# Patient Record
(demographics unavailable — no encounter records)

---

## 2024-10-08 NOTE — PLAN
[FreeTextEntry1] : Acute Sinusitis  nasal saline rinses  start augmentin 875-125mg po bid x 10 days w/ food  Probiotic po daily Increase Fluids Advised humidifier use   right leg trace pitting edema  elevate legs  f/u if no relief pt agreed w/plan

## 2024-10-08 NOTE — HISTORY OF PRESENT ILLNESS
[Congestion] : congestion [Cold Symptoms] : cold symptoms [Sore Throat] : sore throat [FreeTextEntry8] :  75yo M presents for c/o nasal congestion x few weeks and he has clear discharge but it wont resolve he has frontal sinus pain and also around the nose he has a scratchy throat but no pain and he feels mucus in the back of his throat  denies fevers,chills, body aches, cough, sore throat, sob, chest pain, diarrhea, loss of taste or smell he has been using dayquil/nyquil and tylenol sinus and cold he took a covid test and it was neg

## 2024-10-08 NOTE — PHYSICAL EXAM
[Normal Oropharynx] : the oropharynx was normal [No Lymphadenopathy] : no lymphadenopathy [Supple] : supple [Normal] : normal rate, regular rhythm, normal S1 and S2 and no murmur heard [No Focal Deficits] : no focal deficits [Normal Affect] : the affect was normal [Normal Mood] : the mood was normal [Normal Insight/Judgement] : insight and judgment were intact [de-identified] : clear discharge in nose, turbinate inflammation noted , mucus in back of throat  [de-identified] : no calf tenderness b/l LE, right leg trace pitting edema

## 2024-10-08 NOTE — REVIEW OF SYSTEMS
[Fever] : no fever [Chills] : no chills [Nasal Discharge] : nasal discharge [Sore Throat] : no sore throat [Chest Pain] : no chest pain [Shortness Of Breath] : no shortness of breath [Cough] : no cough

## 2024-11-07 NOTE — HISTORY OF PRESENT ILLNESS
[Lower back] : lower back [8] : 8 [5] : 5 [Burning] : burning [Dull/Aching] : dull/aching [Radiating] : radiating [Tightness] : tightness [Constant] : constant [Leisure] : leisure [Social interactions] : social interactions [Meds] : meds [FreeTextEntry1] : 2024 - Patient presents for FUV regarding their lower back/left leg pain. Patient was last seen about 7 months ago and was doing well with significant decrease in pain. Patient reports a gradual return of symptoms over the past month. Pain is located to axial low back with radiation into the left buttocks, posterior thigh and calf. He continues to go to formal PT 1 time/week and remains active daily as a . Presents today to discuss next steps in his treatment plan with the hope of decreasing his pain and increasing his functionality and quality of life.   4/15/2024 - Patient presents for FUV after a L5-S1 LESI on 3/29/2024.  Patient reports an 80% overall relief from pain, his radicular left thigh pain has mostly resolved, but he still has some residual pain focal to the axial lower back; he continues to go to formal PT once a week with short term benefit and performs a HEP.    2024 - The patient presents for initial evaluation regarding their lower back/left leg pain. Patient was referred by Dr. Black. Patient has a long-standing history of lower back pain, his pain is across the lower back with radiation down the left leg, pain distribution is even but with bending and lifting his lower back pain is more prominent; he has paresthesias bilaterally in the feet, worse at night when laying down. Patient takes Advil PRN and 300 mg of gabapentin QID, and has been doing PT.   Has undergone lumbar epidural steroid injections in the past with me over 4 years ago with significant benefit.  Injections: 1) L5-S1 LESI (3/29/2024)  Pertinent Surgical History: N/A   Imagin) MRI Cervical Spine (12/3/2023) - [facility]  C2-3: There is left facet arthropathy without central canal stenosis or foraminal narrowing C3-4: There is broad-based disc herniation, ligamentum flavum thickening resulting in mild central canal stenosis. Uncovertebral arthropathy results in severe left foraminal narrowing C4-5: There is a grade 1 retrolisthesis, advanced degenerative disc disease with disc osteophyte complex resulting in moderate central canal stenosis. Uncovertebral arthropathy results in severe right and mild left foraminal narrowing C5-6: There is small disc osteophyte complex without significant central canal stenosis. C6-7: There is a disc osteophyte complex without significant central canal stenosis. Uncovertebral arthropathy results in mild right foraminal narrowing C7-T1: There is a right paracentral disc herniation without central canal stenosis   2) MRI Lumbar Spine (10/3/2023) - [facility]  There is degenerative disc disease with decreased T2 disc signal from L1-L2 through L5-S1 L1-2: There is diffuse disc bulging without significant central canal stenosis. There is mild left foraminal narrowing L2-3: There is diffuse disc bulging and facet arthropathy resulting in moderate bilateral foraminal narrowing L3-4: There is diffuse disc bulging, right paracentral disc herniation facet arthropathy impinging the descending right L4 nerve roots with moderate bilateral foraminal narrowing and mild central canal stenosis L4-5: There is diffuse disc bulging, ligamentum flavum thickening and facet arthropathy resulting in moderate central canal stenosis, impingement of the descending L5 nerve roots, and moderate foraminal narrowing with impingement of the extraforaminal right L4 nerve L5-S1: There is diffuse disc bulging and facet arthropathy resulting in moderate right and severe left foraminal narrowing S2 and exiting left L5 nerve roots  Physician Disclaimer: I have personally reviewed and confirmed all HPI data with the patient.  [] : Post Surgical Visit: no [FreeTextEntry7] : LEFT HIP, LEFT FOOT  [de-identified] : Driving after football practice  [de-identified] : C MRI AT

## 2024-11-07 NOTE — HISTORY OF PRESENT ILLNESS
[Lower back] : lower back [8] : 8 [5] : 5 [Burning] : burning [Dull/Aching] : dull/aching [Radiating] : radiating [Tightness] : tightness [Constant] : constant [Leisure] : leisure [Social interactions] : social interactions [Meds] : meds [FreeTextEntry1] : 2024 - Patient presents for FUV regarding their lower back/left leg pain. Patient was last seen about 7 months ago and was doing well with significant decrease in pain. Patient reports a gradual return of symptoms over the past month. Pain is located to axial low back with radiation into the left buttocks, posterior thigh and calf. He continues to go to formal PT 1 time/week and remains active daily as a . Presents today to discuss next steps in his treatment plan with the hope of decreasing his pain and increasing his functionality and quality of life.   4/15/2024 - Patient presents for FUV after a L5-S1 LESI on 3/29/2024.  Patient reports an 80% overall relief from pain, his radicular left thigh pain has mostly resolved, but he still has some residual pain focal to the axial lower back; he continues to go to formal PT once a week with short term benefit and performs a HEP.    2024 - The patient presents for initial evaluation regarding their lower back/left leg pain. Patient was referred by Dr. Black. Patient has a long-standing history of lower back pain, his pain is across the lower back with radiation down the left leg, pain distribution is even but with bending and lifting his lower back pain is more prominent; he has paresthesias bilaterally in the feet, worse at night when laying down. Patient takes Advil PRN and 300 mg of gabapentin QID, and has been doing PT.   Has undergone lumbar epidural steroid injections in the past with me over 4 years ago with significant benefit.  Injections: 1) L5-S1 LESI (3/29/2024)  Pertinent Surgical History: N/A   Imagin) MRI Cervical Spine (12/3/2023) - [facility]  C2-3: There is left facet arthropathy without central canal stenosis or foraminal narrowing C3-4: There is broad-based disc herniation, ligamentum flavum thickening resulting in mild central canal stenosis. Uncovertebral arthropathy results in severe left foraminal narrowing C4-5: There is a grade 1 retrolisthesis, advanced degenerative disc disease with disc osteophyte complex resulting in moderate central canal stenosis. Uncovertebral arthropathy results in severe right and mild left foraminal narrowing C5-6: There is small disc osteophyte complex without significant central canal stenosis. C6-7: There is a disc osteophyte complex without significant central canal stenosis. Uncovertebral arthropathy results in mild right foraminal narrowing C7-T1: There is a right paracentral disc herniation without central canal stenosis   2) MRI Lumbar Spine (10/3/2023) - [facility]  There is degenerative disc disease with decreased T2 disc signal from L1-L2 through L5-S1 L1-2: There is diffuse disc bulging without significant central canal stenosis. There is mild left foraminal narrowing L2-3: There is diffuse disc bulging and facet arthropathy resulting in moderate bilateral foraminal narrowing L3-4: There is diffuse disc bulging, right paracentral disc herniation facet arthropathy impinging the descending right L4 nerve roots with moderate bilateral foraminal narrowing and mild central canal stenosis L4-5: There is diffuse disc bulging, ligamentum flavum thickening and facet arthropathy resulting in moderate central canal stenosis, impingement of the descending L5 nerve roots, and moderate foraminal narrowing with impingement of the extraforaminal right L4 nerve L5-S1: There is diffuse disc bulging and facet arthropathy resulting in moderate right and severe left foraminal narrowing S2 and exiting left L5 nerve roots  Physician Disclaimer: I have personally reviewed and confirmed all HPI data with the patient.  [] : Post Surgical Visit: no [FreeTextEntry7] : LEFT HIP, LEFT FOOT  [de-identified] : Driving after football practice  [de-identified] : C MRI AT

## 2024-11-07 NOTE — PHYSICAL EXAM
[de-identified] : Constitutional:   - No acute distress   - Well developed; well nourished    Neurological:   - normal mood and affect   - alert and oriented x 3     Cardiovascular:   - grossly normal   Lumbar Spine Exam:   Inspection:  erythema (-)  ecchymosis (-)  rashes (-)  alignment: no scoliosis   Palpation:  Midline lumbar tenderness:            (-)  midline thoracic tenderness:          (-)  Lumbar paraspinal tenderness:  L (-) ; R (-)  thoracic paraspinal tenderness: L (-) ; R (-)  sciatic nerve tenderness :          L (-) ; R (-)  SI joint tenderness:                     L (-) ; R (-)  GTB tenderness:                        L (-);  R (-)   ROM: WNL; stiffness throughout ROM testing pain with extreme extension  Strength:                                     Right       Left     Hip Flexion:                (5/5)       (5/5)  Quadriceps:               (5/5)       (5/5)  Hamstrings:                (5/5)       (5/5)  Ankle Dorsiflexion:     (5/5)       (5/5)  EHL:                           (5/5)       (5/5)  Ankle Plantarflexion:  (5/5)       (5/5)   Special Tests:  SLR:                            R (-); L (+)  Facet loading:             R (+); L (+)  ZA test:                R (n/a); L (n/a)  Hamstring tightness:   R (+); L (+)   Neurologic:  SILT throughout right lower extremity  SILT throughout left lower extremity   Reflexes normal and symmetric bilateral lower extremities   Gait:  Mildly antalgic gait  ambulates without assistive device

## 2024-11-07 NOTE — PHYSICAL EXAM
[de-identified] : Constitutional:   - No acute distress   - Well developed; well nourished    Neurological:   - normal mood and affect   - alert and oriented x 3     Cardiovascular:   - grossly normal   Lumbar Spine Exam:   Inspection:  erythema (-)  ecchymosis (-)  rashes (-)  alignment: no scoliosis   Palpation:  Midline lumbar tenderness:            (-)  midline thoracic tenderness:          (-)  Lumbar paraspinal tenderness:  L (-) ; R (-)  thoracic paraspinal tenderness: L (-) ; R (-)  sciatic nerve tenderness :          L (-) ; R (-)  SI joint tenderness:                     L (-) ; R (-)  GTB tenderness:                        L (-);  R (-)   ROM: WNL; stiffness throughout ROM testing pain with extreme extension  Strength:                                     Right       Left     Hip Flexion:                (5/5)       (5/5)  Quadriceps:               (5/5)       (5/5)  Hamstrings:                (5/5)       (5/5)  Ankle Dorsiflexion:     (5/5)       (5/5)  EHL:                           (5/5)       (5/5)  Ankle Plantarflexion:  (5/5)       (5/5)   Special Tests:  SLR:                            R (-); L (+)  Facet loading:             R (+); L (+)  ZA test:                R (n/a); L (n/a)  Hamstring tightness:   R (+); L (+)   Neurologic:  SILT throughout right lower extremity  SILT throughout left lower extremity   Reflexes normal and symmetric bilateral lower extremities   Gait:  Mildly antalgic gait  ambulates without assistive device

## 2024-11-07 NOTE — ASSESSMENT
[FreeTextEntry1] : A discussion regarding available pain management treatment options occurred with the patient.  These included interventional, rehabilitative, pharmacological, and alternative modalities. We will proceed with the following:    Interventional treatment options: - Proceed with repeat left PM L5-S1 LESI (80 mg Kenalog) with fluoroscopic guidance - Can consider with bilateral L4-L5, L5-S1 facet joint MBB with continued axial low back pain - see additional instructions below    Rehabilitative options:   - Continue physical therapy, new script provided today - participation in active HEP was discussed and encouraged as tolerated - Home exercise sheet provided previously  Medication based treatment options: - continue ibuprofen 400-600 mg up to TID as needed - Continue gabapentin 1200 mg daily as per PCP; further titration based upon clinical response  - see additional instructions below    Complementary treatment options:   - Weight management and lifestyle modifications discussed    Additional treatment recommendations as follows:   - Patient will follow-up with Dr. Black as directed - Follow up 1-2 weeks post injection for assessment of efficacy and further treatment recommendations  The risks, benefits and alternatives of the proposed procedure were explained in detail with the patient. The risks outlined include but are not limited to infection, bleeding, post- dural puncture headache, nerve injury, a temporary increase in pain, failure to resolve symptoms, need for future interventions, allergic reaction, and possible elevation of blood sugar in diabetics if using corticosteroid.  All questions were answered to patient's apparent satisfaction, and he/she verbalized an understanding.  We have discussed the risks, benefits, and alternatives for NSAID therapy including but not limited to the risk of bleeding, thrombosis, gastric mucosal irritation/ulceration, allergic reaction and kidney dysfunction.  The patient verbalizes an understanding.  I, Sharita Corea NP, acting as scribe, attest that this documentation has been prepared under the direction and in the presence of Provider Negro Kinney DO.    The documentation recorded by the scribe, in my presence, accurately reflects the service I personally performed, and the decisions made by me with my edits as appropriate.

## 2024-12-10 NOTE — PLAN
[FreeTextEntry1] : HTN controlled labs reviewed  continue lisinopril 5mg po daily continue to monitor bp and f/u sooner if bp is rising  DM II controlled a1c was 6.4  continue metformin ER 500mg 2 tabs po qhs  HLD controlled  recent labs reviewed  continue rosuvastatin 5mg po qhsHLD denies muscle pain   f/u 3mos or sooner if issues arise pt agreed w/plan.

## 2024-12-10 NOTE — PHYSICAL EXAM
[No Lymphadenopathy] : no lymphadenopathy [Supple] : supple [Normal] : normal rate, regular rhythm, normal S1 and S2 and no murmur heard [No Edema] : there was no peripheral edema [No Focal Deficits] : no focal deficits [Normal Affect] : the affect was normal [Normal Mood] : the mood was normal [Normal Insight/Judgement] : insight and judgment were intact [de-identified] : no calf tenderness b/l LE

## 2024-12-10 NOTE — HISTORY OF PRESENT ILLNESS
[FreeTextEntry1] : patient presents for follow up. [de-identified] : 75yo M presents for f/u HTN, HLD and DM II  he is feeling well  Denies chest pain, palpitations, shortness of breath, Headaches, vision changes or LE edema denies muscle pain he saw the endocrinologist last week

## 2024-12-26 NOTE — IMAGING
[de-identified] : The patient is a well appearing 76 year old male of their stated age. Patient ambulates with a normal gait. Negative straight leg raise bilateral   Effected Knee: RIGHT ROM:  0-135 degrees Lachman: Negative Pivot Shift: Negative Anterior Drawer: Negative Posterior Drawer / Sag: Negative Varus Stress 0 degrees: Stable Varus Stress 30 degrees: Stable Valgus Stress 0 degrees: Stable Valgus Stress 30 degrees: Stable Medial Kannan: Negative  Lateral Kannan: Negative Patella Glide: 2+ Patella Apprehension: Negative Patella Grind: Negative   Palpation: Medial Joint Line: TENDER  Lateral Joint Line: Nontender Medial Collateral Ligament: Nontender Lateral Collateral Ligament/PLC: Nontender Distal Femur: Nontender Proximal Tibia: Nontender Tibial Tubercle: Nontender Distal Pole Patella: Nontender Quadriceps Tendon: Nontender & Intact Patella Tendon: Nontender & Intact Medial Femoral Condyle: Nontender Medial Distal Hamstring/PES: Nontender Lateral Distal Hamstring: Nontender & Stable Iliotibial Band: Nontender Medial Patellofemoral Ligament: Nontender Adductor: Nontender Proximal GSC-Plantaris: Nontender Calf: Supple & Nontender   Inspection: Deformity: No Erythema: No Ecchymosis: No Abrasions: No Effusion: MINIMAL  Prepatella Bursitis: No Neurologic Exam: Sensation L4-S1: Grossly Intact Motor Exam: Quadriceps: 5 out of 5 Hamstrings: 5 out of 5 EHL: 5 out of 5 FHL: 5 out of 5 TA: 5 out of 5 GS: 5 out of 5 Circulatory/Pulses: Dorsalis Pedis: 2+ Posterior Tibialis: 2+ Additional Pertinent Findings: None   >>>>>>>>>>>>>>>>>>>>>>>>>>>>>>>>>>>>>>>>>>>>>>>>>>>>>>>>>>>>>>>>  Effected Knee: LEFT  ROM:  0-135 degrees Lachman: Negative Pivot Shift: Negative Anterior Drawer: Negative Posterior Drawer / Sag: Negative Varus Stress 0 degrees: Stable Varus Stress 30 degrees: Stable Valgus Stress 0 degrees: Stable Valgus Stress 30 degrees: Stable Medial Kannan: Negative  Lateral Kannan: Negative Patella Glide: 2+ Patella Apprehension: Negative Patella Grind: POSITIVE    Palpation: Medial Joint Line: TENDER  Lateral Joint Line: TENDER  Medial Collateral Ligament: Nontender Lateral Collateral Ligament/PLC: Nontender Distal Femur: Nontender Proximal Tibia: Nontender Tibial Tubercle: Nontender Distal Pole Patella: Nontender Quadriceps Tendon: Nontender & Intact Patella Tendon: Nontender & Intact Medial Femoral Condyle: Nontender Medial Distal Hamstring/PES: Nontender Lateral Distal Hamstring: Nontender & Stable Iliotibial Band: Nontender Medial Patellofemoral Ligament: Nontender Adductor: Nontender Proximal GSC-Plantaris: Nontender Calf: Supple & Nontender   Inspection: Deformity: No Erythema: No Ecchymosis: No Abrasions: No Effusion: MILD  Prepatella Bursitis: No Neurologic Exam: Sensation L4-S1: Grossly Intact Motor Exam: Quadriceps: 5 out of 5 Hamstrings: 5 out of 5 EHL: 5 out of 5 FHL: 5 out of 5 TA: 5 out of 5 GS: 5 out of 5 Circulatory/Pulses: Dorsalis Pedis: 2+ Posterior Tibialis: 2+ Additional Pertinent Findings: None   Assessment: The patient is a 76-year-old male with bilateral knee pain and radiographic and physical exam findings consistent with OA and meniscal degeneration. The patient's condition is acute Documents/Results Reviewed Today: None   Tests/Studies Independently Interpreted Today: None     Pertinent findings include: RIGHT KNEE: 0-135, minimal effusion, +patellofemoral grind, MJLT. LEFT KNEE: 0-135, mild effusion, +patellofemoral grind, MJLT, LJLT.  Confounding medical conditions/concerns: PMH Diabetes, last A1C 7.1.   Plan: The patient reports mild relief from previous bilateral knee GelONE injections, left knee on 10/3/24 and right knee on 9/26/24. Again, we reviewed all treatment options both operative vs non-operative for the patient's bilateral knee arthritis. Patient will continue HEP to focus on strengthening. The patient is aware and understands that if he fails all conservative treatment modalities, he should consider a total knee arthroplasty. The indication of this is clinical therefore, the patient decides to defer and continue with non-operative management of arthritic related pain. He is not due for repeat Visco injections until 3/2025, we discussed supplementation first with a corticosteroid injection depending on his condition. Continue use of reparel knee sleeve. Discussed taking OTC anti-inflammatories as needed - use as directed. Modify activity as discussed.  Tests Ordered: None  Prescription Medications Ordered: Discussed appropriate use of OTC anti-inflammatories and analgesics (including but not limited to Aleve, Advil, Tylenol, Motrin, Ibuprofen, Voltaren gel, etc.)   Braces/DME Ordered: None Activity/Work/Sports Status: As tolerated  Additional Instructions: Avoid deep squatting  Follow-Up:  3 months   The patient's current medication management of their orthopedic diagnosis was reviewed today: The patient declined and/or was contraindicated for the recommended prescription medication Naprosyn and will use over the counter Advil, Aleve, Voltaren Gel or Tylenol as directed. (1) We discussed a comprehensive treatment plan that included possible pharmaceutical management involving the use of prescription strength medications including but not limited to options such as oral Naprosyn 500mg BID, once daily Meloxicam 15 mg, or 500-650 mg Tylenol versus over the counter oral medications and topical prescription NSAID Pennsaid vs over the counter Voltaren gel.  Based on our extensive discussion, the patient declined prescription medication and will use over the counter Advil, Alleve, Voltaren Gel or Tylenol as directed. (2) There is a moderate risk of morbidity with further treatment, especially from use of prescription strength medications and possible side effects of these medications which include upset stomach with oral medications, skin reactions to topical medications and cardiac/renal issues with long term use. (3) I recommended that the patient follow-up with their medical physician to discuss any significant specific potential issues with long term medication use such as interactions with current medications or with exacerbation of underlying medical comorbidities. (4) The benefits and risks associated with use of injectable, oral or topical, prescription and over the counter anti-inflammatory medications were discussed with the patient. The patient voiced understanding of the risks including but not limited to bleeding, stroke, kidney dysfunction, heart disease, and were referred to the black box warning label for further information.   Parul MONTENEGRO attest that this documentation has been prepared under the direction and in the presence of Provider Dr. Joe Castro.   The documentation recorded by the scribe accurately reflects the services IDr. Joe, personally performed and the decisions made by me.

## 2024-12-26 NOTE — HISTORY OF PRESENT ILLNESS
[de-identified] : The patient is a 76 year old right hand dominant male who presents today complaining of bilateral knee pain.  Date of Injury/Onset: 7/18/24 Pain:    At Rest: 0/10  With Activity:  5/10  Mechanism of injury: Gradual onset of pain  This is NOT a Work Related Injury being treated under Worker's Compensation. This is NOT an athletic injury occurring associated with an interscholastic or organized sports team. Quality of symptoms: swelling, pressure, burning, weakness, limited ROM  Improves with: brace, compression wrap, ice  Worse with: prolonged standing  Treatment/Imaging/Studies Since Last Visit: right knee gel one/aspiration 9/26/24, left knee gel one injection 10/3/24 	Reports Available For Review Today: none Out of work/sport: Currently working  School/Sport/Position/Occupation: [employer] , yardwork, etc.  Changes since last visit: Patient states he is here for the right knee today. L knee gel one injection last visit 10/3/24. R knee gel one injection 9/6/24 with some relief. Knee pain persists. Additional Information: [facility] Diabetes, last A1C 7.2, sees Dr. Odell for injections - left side LBP

## 2025-03-07 NOTE — IMAGING
[de-identified] : The patient is a well appearing 76 year old male of their stated age. Patient ambulates with a normal gait. Negative straight leg raise bilateral   Effected Knee: RIGHT ROM:  0-135 degrees Lachman: Negative Pivot Shift: Negative Anterior Drawer: Negative Posterior Drawer / Sag: Negative Varus Stress 0 degrees: Stable Varus Stress 30 degrees: Stable Valgus Stress 0 degrees: Stable Valgus Stress 30 degrees: Stable Medial Kannan: Negative  Lateral Kannan: Negative Patella Glide: 2+ Patella Apprehension: Negative Patella Grind: Negative   Palpation: Medial Joint Line: TENDER  Lateral Joint Line: Nontender Medial Collateral Ligament: Nontender Lateral Collateral Ligament/PLC: Nontender Distal Femur: Nontender Proximal Tibia: Nontender Tibial Tubercle: Nontender Distal Pole Patella: Nontender Quadriceps Tendon: Nontender & Intact Patella Tendon: Nontender & Intact Medial Femoral Condyle: Nontender Medial Distal Hamstring/PES: Nontender Lateral Distal Hamstring: Nontender & Stable Iliotibial Band: Nontender Medial Patellofemoral Ligament: Nontender Adductor: Nontender Proximal GSC-Plantaris: Nontender Calf: Supple & Nontender   Inspection: Deformity: No Erythema: No Ecchymosis: No Abrasions: No Effusion: MINIMAL  Prepatella Bursitis: No Neurologic Exam: Sensation L4-S1: Grossly Intact Motor Exam: Quadriceps: 5 out of 5 Hamstrings: 5 out of 5 EHL: 5 out of 5 FHL: 5 out of 5 TA: 5 out of 5 GS: 5 out of 5 Circulatory/Pulses: Dorsalis Pedis: 2+ Posterior Tibialis: 2+ Additional Pertinent Findings: None   >>>>>>>>>>>>>>>>>>>>>>>>>>>>>>>>>>>>>>>>>>>>>>>>>>>>>>>>>>>>>>>>  Effected Knee: LEFT  ROM:  0-135 degrees Lachman: Negative Pivot Shift: Negative Anterior Drawer: Negative Posterior Drawer / Sag: Negative Varus Stress 0 degrees: Stable Varus Stress 30 degrees: Stable Valgus Stress 0 degrees: Stable Valgus Stress 30 degrees: Stable Medial Kannan: Negative  Lateral Kannan: Negative Patella Glide: 2+ Patella Apprehension: Negative Patella Grind: POSITIVE    Palpation: Medial Joint Line: TENDER  Lateral Joint Line: TENDER  Medial Collateral Ligament: Nontender Lateral Collateral Ligament/PLC: Nontender Distal Femur: Nontender Proximal Tibia: Nontender Tibial Tubercle: Nontender Distal Pole Patella: Nontender Quadriceps Tendon: Nontender & Intact Patella Tendon: Nontender & Intact Medial Femoral Condyle: Nontender Medial Distal Hamstring/PES: Nontender Lateral Distal Hamstring: Nontender & Stable Iliotibial Band: Nontender Medial Patellofemoral Ligament: Nontender Adductor: Nontender Proximal GSC-Plantaris: Nontender Calf: Supple & Nontender   Inspection: Deformity: No Erythema: No Ecchymosis: No Abrasions: No Effusion: MILD  Prepatella Bursitis: No Neurologic Exam: Sensation L4-S1: Grossly Intact Motor Exam: Quadriceps: 5 out of 5 Hamstrings: 5 out of 5 EHL: 5 out of 5 FHL: 5 out of 5 TA: 5 out of 5 GS: 5 out of 5 Circulatory/Pulses: Dorsalis Pedis: 2+ Posterior Tibialis: 2+ Additional Pertinent Findings: None   Assessment: The patient is a 76-year-old male with bilateral knee pain and radiographic and physical exam findings consistent with OA and meniscal degeneration. The patient's condition is acute Documents/Results Reviewed Today: None   Tests/Studies Independently Interpreted Today: None     Pertinent findings include: RIGHT KNEE: 0-135, minimal effusion, +patellofemoral grind, MJLT. LEFT KNEE: 0-135, mild effusion, +patellofemoral grind, MJLT, LJLT.  Confounding medical conditions/concerns: PMH Diabetes, last A1C 7.1.   Plan: The patient reports mild relief from previous bilateral knee GelONE injections, left knee on 10/3/24 and right knee on 9/26/24 but symptoms have since returned. Again, we reviewed all treatment options both operative vs non-operative for the patient's bilateral knee arthritis. Patient will continue HEP to focus on strengthening. The patient is aware and understands that if he fails all conservative treatment modalities, he should consider a total knee arthroplasty. The indication of this is clinical therefore, the patient decides to defer and continue with non-operative management of arthritic related pain. We will submit for authorization today for Right knee Gel One injection. Continue use of reparel knee sleeve. Discussed taking OTC anti-inflammatories as needed - use as directed. Modify activity as discussed.  Tests Ordered: None  Prescription Medications Ordered: Discussed appropriate use of OTC anti-inflammatories and analgesics (including but not limited to Aleve, Advil, Tylenol, Motrin, Ibuprofen, Voltaren gel, etc.)   Braces/DME Ordered: None Activity/Work/Sports Status: As tolerated  Additional Instructions: Avoid deep squatting  Follow-Up:  for Visco injections, right knee 3/27/2025 and left knee after 4/4/2025  The patient's current medication management of their orthopedic diagnosis was reviewed today: The patient declined and/or was contraindicated for the recommended prescription medication Naprosyn and will use over the counter Advil, Aleve, Voltaren Gel or Tylenol as directed. (1) We discussed a comprehensive treatment plan that included possible pharmaceutical management involving the use of prescription strength medications including but not limited to options such as oral Naprosyn 500mg BID, once daily Meloxicam 15 mg, or 500-650 mg Tylenol versus over the counter oral medications and topical prescription NSAID Pennsaid vs over the counter Voltaren gel.  Based on our extensive discussion, the patient declined prescription medication and will use over the counter Advil, Aleve, Voltaren Gel or Tylenol as directed. (2) There is a moderate risk of morbidity with further treatment, especially from use of prescription strength medications and possible side effects of these medications which include upset stomach with oral medications, skin reactions to topical medications and cardiac/renal issues with long term use. (3) I recommended that the patient follow-up with their medical physician to discuss any significant specific potential issues with long term medication use such as interactions with current medications or with exacerbation of underlying medical comorbidities. (4) The benefits and risks associated with use of injectable, oral or topical, prescription and over the counter anti-inflammatory medications were discussed with the patient. The patient voiced understanding of the risks including but not limited to bleeding, stroke, kidney dysfunction, heart disease, and were referred to the black box warning label for further information.   Hermelinda MONTENEGRO attest that this documentation has been prepared under the direction and in the presence of Provider Dr. Joe Castro.  The documentation recorded by the scribe accurately reflects the services IDr. Joe, personally performed and the decisions made by me.

## 2025-03-07 NOTE — IMAGING
[de-identified] : The patient is a well appearing 76 year old male of their stated age. Patient ambulates with a normal gait. Negative straight leg raise bilateral   Effected Knee: RIGHT ROM:  0-135 degrees Lachman: Negative Pivot Shift: Negative Anterior Drawer: Negative Posterior Drawer / Sag: Negative Varus Stress 0 degrees: Stable Varus Stress 30 degrees: Stable Valgus Stress 0 degrees: Stable Valgus Stress 30 degrees: Stable Medial Kannan: Negative  Lateral Kannan: Negative Patella Glide: 2+ Patella Apprehension: Negative Patella Grind: Negative   Palpation: Medial Joint Line: TENDER  Lateral Joint Line: Nontender Medial Collateral Ligament: Nontender Lateral Collateral Ligament/PLC: Nontender Distal Femur: Nontender Proximal Tibia: Nontender Tibial Tubercle: Nontender Distal Pole Patella: Nontender Quadriceps Tendon: Nontender & Intact Patella Tendon: Nontender & Intact Medial Femoral Condyle: Nontender Medial Distal Hamstring/PES: Nontender Lateral Distal Hamstring: Nontender & Stable Iliotibial Band: Nontender Medial Patellofemoral Ligament: Nontender Adductor: Nontender Proximal GSC-Plantaris: Nontender Calf: Supple & Nontender   Inspection: Deformity: No Erythema: No Ecchymosis: No Abrasions: No Effusion: MINIMAL  Prepatella Bursitis: No Neurologic Exam: Sensation L4-S1: Grossly Intact Motor Exam: Quadriceps: 5 out of 5 Hamstrings: 5 out of 5 EHL: 5 out of 5 FHL: 5 out of 5 TA: 5 out of 5 GS: 5 out of 5 Circulatory/Pulses: Dorsalis Pedis: 2+ Posterior Tibialis: 2+ Additional Pertinent Findings: None   >>>>>>>>>>>>>>>>>>>>>>>>>>>>>>>>>>>>>>>>>>>>>>>>>>>>>>>>>>>>>>>>  Effected Knee: LEFT  ROM:  0-135 degrees Lachman: Negative Pivot Shift: Negative Anterior Drawer: Negative Posterior Drawer / Sag: Negative Varus Stress 0 degrees: Stable Varus Stress 30 degrees: Stable Valgus Stress 0 degrees: Stable Valgus Stress 30 degrees: Stable Medial Kannan: Negative  Lateral Kannan: Negative Patella Glide: 2+ Patella Apprehension: Negative Patella Grind: POSITIVE    Palpation: Medial Joint Line: TENDER  Lateral Joint Line: TENDER  Medial Collateral Ligament: Nontender Lateral Collateral Ligament/PLC: Nontender Distal Femur: Nontender Proximal Tibia: Nontender Tibial Tubercle: Nontender Distal Pole Patella: Nontender Quadriceps Tendon: Nontender & Intact Patella Tendon: Nontender & Intact Medial Femoral Condyle: Nontender Medial Distal Hamstring/PES: Nontender Lateral Distal Hamstring: Nontender & Stable Iliotibial Band: Nontender Medial Patellofemoral Ligament: Nontender Adductor: Nontender Proximal GSC-Plantaris: Nontender Calf: Supple & Nontender   Inspection: Deformity: No Erythema: No Ecchymosis: No Abrasions: No Effusion: MILD  Prepatella Bursitis: No Neurologic Exam: Sensation L4-S1: Grossly Intact Motor Exam: Quadriceps: 5 out of 5 Hamstrings: 5 out of 5 EHL: 5 out of 5 FHL: 5 out of 5 TA: 5 out of 5 GS: 5 out of 5 Circulatory/Pulses: Dorsalis Pedis: 2+ Posterior Tibialis: 2+ Additional Pertinent Findings: None   Assessment: The patient is a 76-year-old male with bilateral knee pain and radiographic and physical exam findings consistent with OA and meniscal degeneration. The patient's condition is acute Documents/Results Reviewed Today: None   Tests/Studies Independently Interpreted Today: None     Pertinent findings include: RIGHT KNEE: 0-135, minimal effusion, +patellofemoral grind, MJLT. LEFT KNEE: 0-135, mild effusion, +patellofemoral grind, MJLT, LJLT.  Confounding medical conditions/concerns: PMH Diabetes, last A1C 7.1.   Plan: The patient reports mild relief from previous bilateral knee GelONE injections, left knee on 10/3/24 and right knee on 9/26/24 but symptoms have since returned. Again, we reviewed all treatment options both operative vs non-operative for the patient's bilateral knee arthritis. Patient will continue HEP to focus on strengthening. The patient is aware and understands that if he fails all conservative treatment modalities, he should consider a total knee arthroplasty. The indication of this is clinical therefore, the patient decides to defer and continue with non-operative management of arthritic related pain. We will submit for authorization today for Right knee Gel One injection. Continue use of reparel knee sleeve. Discussed taking OTC anti-inflammatories as needed - use as directed. Modify activity as discussed.  Tests Ordered: None  Prescription Medications Ordered: Discussed appropriate use of OTC anti-inflammatories and analgesics (including but not limited to Aleve, Advil, Tylenol, Motrin, Ibuprofen, Voltaren gel, etc.)   Braces/DME Ordered: None Activity/Work/Sports Status: As tolerated  Additional Instructions: Avoid deep squatting  Follow-Up:  for Visco injections, right knee 3/27/2025 and left knee after 4/4/2025  The patient's current medication management of their orthopedic diagnosis was reviewed today: The patient declined and/or was contraindicated for the recommended prescription medication Naprosyn and will use over the counter Advil, Aleve, Voltaren Gel or Tylenol as directed. (1) We discussed a comprehensive treatment plan that included possible pharmaceutical management involving the use of prescription strength medications including but not limited to options such as oral Naprosyn 500mg BID, once daily Meloxicam 15 mg, or 500-650 mg Tylenol versus over the counter oral medications and topical prescription NSAID Pennsaid vs over the counter Voltaren gel.  Based on our extensive discussion, the patient declined prescription medication and will use over the counter Advil, Aleve, Voltaren Gel or Tylenol as directed. (2) There is a moderate risk of morbidity with further treatment, especially from use of prescription strength medications and possible side effects of these medications which include upset stomach with oral medications, skin reactions to topical medications and cardiac/renal issues with long term use. (3) I recommended that the patient follow-up with their medical physician to discuss any significant specific potential issues with long term medication use such as interactions with current medications or with exacerbation of underlying medical comorbidities. (4) The benefits and risks associated with use of injectable, oral or topical, prescription and over the counter anti-inflammatory medications were discussed with the patient. The patient voiced understanding of the risks including but not limited to bleeding, stroke, kidney dysfunction, heart disease, and were referred to the black box warning label for further information.   Hermelinda MONTENEGRO attest that this documentation has been prepared under the direction and in the presence of Provider Dr. Joe Castro.  The documentation recorded by the scribe accurately reflects the services IDr. Joe, personally performed and the decisions made by me.

## 2025-03-28 NOTE — HISTORY OF PRESENT ILLNESS
[de-identified] : The patient is a 76 year old right hand dominant male who presents today complaining of bilateral knee pain.  Date of Injury/Onset: 7/18/24 Pain:    At Rest: 2/10  With Activity:  5/10  Mechanism of injury: Gradual onset of pain  This is NOT a Work Related Injury being treated under Worker's Compensation. This is NOT an athletic injury occurring associated with an interscholastic or organized sports team. Quality of symptoms: swelling, pressure, burning, weakness, limited ROM  Improves with: brace, compression wrap, ice  Worse with: prolonged standing  Treatment/Imaging/Studies Since Last Visit: right knee gel one/aspiration 9/26/24, left knee gel one injection 10/3/24 	Reports Available For Review Today: none Out of work/sport: Currently working  School/Sport/Position/Occupation: [employer] , yardwork, etc.  Changes since last visit: bilateral knee pain persists, interested in possible visco injection today for the right knee.  Additional Information: PMH Diabetes, last A1C 7.2, sees Dr. Odell for injections - left side LBP

## 2025-03-28 NOTE — HISTORY OF PRESENT ILLNESS
[de-identified] : The patient is a 76 year old right hand dominant male who presents today complaining of bilateral knee pain.  Date of Injury/Onset: 7/18/24 Pain:    At Rest: 2/10  With Activity:  5/10  Mechanism of injury: Gradual onset of pain  This is NOT a Work Related Injury being treated under Worker's Compensation. This is NOT an athletic injury occurring associated with an interscholastic or organized sports team. Quality of symptoms: swelling, pressure, burning, weakness, limited ROM  Improves with: brace, compression wrap, ice  Worse with: prolonged standing  Treatment/Imaging/Studies Since Last Visit: right knee gel one/aspiration 9/26/24, left knee gel one injection 10/3/24 	Reports Available For Review Today: none Out of work/sport: Currently working  School/Sport/Position/Occupation: [employer] , yardwork, etc.  Changes since last visit: bilateral knee pain persists, interested in possible visco injection today for the right knee.  Additional Information: PMH Diabetes, last A1C 7.2, sees Dr. Odell for injections - left side LBP

## 2025-03-28 NOTE — IMAGING
[de-identified] : The patient is a well appearing 76 year old male of their stated age. Patient ambulates with a normal gait. Negative straight leg raise bilateral   Effected Knee: RIGHT ROM:  0-135 degrees Lachman: Negative Pivot Shift: Negative Anterior Drawer: Negative Posterior Drawer / Sag: Negative Varus Stress 0 degrees: Stable Varus Stress 30 degrees: Stable Valgus Stress 0 degrees: Stable Valgus Stress 30 degrees: Stable Medial Kannan: Negative  Lateral Kannan: Negative Patella Glide: 2+ Patella Apprehension: Negative Patella Grind: Negative   Palpation: Medial Joint Line: TENDER  Lateral Joint Line: Nontender Medial Collateral Ligament: Nontender Lateral Collateral Ligament/PLC: Nontender Distal Femur: Nontender Proximal Tibia: Nontender Tibial Tubercle: Nontender Distal Pole Patella: Nontender Quadriceps Tendon: Nontender & Intact Patella Tendon: Nontender & Intact Medial Femoral Condyle: Nontender Medial Distal Hamstring/PES: Nontender Lateral Distal Hamstring: Nontender & Stable Iliotibial Band: Nontender Medial Patellofemoral Ligament: Nontender Adductor: Nontender Proximal GSC-Plantaris: Nontender Calf: Supple & Nontender   Inspection: Deformity: No Erythema: No Ecchymosis: No Abrasions: No Effusion: MINIMAL  Prepatella Bursitis: No Neurologic Exam: Sensation L4-S1: Grossly Intact Motor Exam: Quadriceps: 5 out of 5 Hamstrings: 5 out of 5 EHL: 5 out of 5 FHL: 5 out of 5 TA: 5 out of 5 GS: 5 out of 5 Circulatory/Pulses: Dorsalis Pedis: 2+ Posterior Tibialis: 2+ Additional Pertinent Findings: None   >>>>>>>>>>>>>>>>>>>>>>>>>>>>>>>>>>>>>>>>>>>>>>>>>>>>>>>>>>>>>>>>  Effected Knee: LEFT  ROM:  0-135 degrees Lachman: Negative Pivot Shift: Negative Anterior Drawer: Negative Posterior Drawer / Sag: Negative Varus Stress 0 degrees: Stable Varus Stress 30 degrees: Stable Valgus Stress 0 degrees: Stable Valgus Stress 30 degrees: Stable Medial Kannan: Negative  Lateral Kannan: Negative Patella Glide: 2+ Patella Apprehension: Negative Patella Grind: POSITIVE    Palpation: Medial Joint Line: TENDER  Lateral Joint Line: TENDER  Medial Collateral Ligament: Nontender Lateral Collateral Ligament/PLC: Nontender Distal Femur: Nontender Proximal Tibia: Nontender Tibial Tubercle: Nontender Distal Pole Patella: Nontender Quadriceps Tendon: Nontender & Intact Patella Tendon: Nontender & Intact Medial Femoral Condyle: Nontender Medial Distal Hamstring/PES: Nontender Lateral Distal Hamstring: Nontender & Stable Iliotibial Band: Nontender Medial Patellofemoral Ligament: Nontender Adductor: Nontender Proximal GSC-Plantaris: Nontender Calf: Supple & Nontender   Inspection: Deformity: No Erythema: No Ecchymosis: No Abrasions: No Effusion: MILD  Prepatella Bursitis: No Neurologic Exam: Sensation L4-S1: Grossly Intact Motor Exam: Quadriceps: 5 out of 5 Hamstrings: 5 out of 5 EHL: 5 out of 5 FHL: 5 out of 5 TA: 5 out of 5 GS: 5 out of 5 Circulatory/Pulses: Dorsalis Pedis: 2+ Posterior Tibialis: 2+ Additional Pertinent Findings: None   Assessment: The patient is a 76-year-old male with bilateral knee pain and radiographic and physical exam findings consistent with OA and meniscal degeneration. The patient's condition is acute Documents/Results Reviewed Today: None   Tests/Studies Independently Interpreted Today: None     Pertinent findings include: RIGHT KNEE: 0-135, minimal effusion, +patellofemoral grind, MJLT. LEFT KNEE: 0-135, mild effusion, +patellofemoral grind, MJLT, LJLT.  Confounding medical conditions/concerns: PMH Diabetes, last A1C 7.1.   Plan: The patient reports mild relief from previous bilateral knee GelONE injections, left knee on 10/3/24 and right knee on 9/26/24 but symptoms have since returned. Again, we reviewed all treatment options both operative vs non-operative for the patient's bilateral knee arthritis. Patient will continue HEP to focus on strengthening. The patient is aware and understands that if he fails all conservative treatment modalities, he should consider a total knee arthroplasty. Discussed treatment options in the form of injections to aid in pain, inflammation, and discomfort. Patient elected to receive Right knee GelOne. Advised patient to rest and ice the area as tolerated. Continue use of reparel knee sleeve. Discussed taking OTC anti-inflammatories as needed - use as directed. Modify activity as discussed.  Tests Ordered: None  Prescription Medications Ordered: Discussed appropriate use of OTC anti-inflammatories and analgesics (including but not limited to Aleve, Advil, Tylenol, Motrin, Ibuprofen, Voltaren gel, etc.)   Braces/DME Ordered: None Activity/Work/Sports Status: As tolerated  Additional Instructions: Avoid deep squatting  Follow-Up:  for left knee after 4/4/2025  Procedure Note: Musculoskeletal Injection Diagnosis: Right knee OA Procedure: Right knee, superolateral, Gel One   Indication: The patient has had persistent pain despite conservative treatment.  Risks, benefits and alternatives to procedure were discussed; all questions were answered to the patient's apparent satisfaction and informed consent obtained. Consent form was signed and dated today.  The patient denied prior problems with local anesthetics, injectable cortisones, chicken allergy, coagulopathy and no relevant drug or preservative allergies or sensitivities.   The area of injection was prepared in a sterile fashion.  Prior to injection a 'Time Out' was conducted in accordance with [facility] policy and the site and nature of procedure verified with the patient.   Procedure: The injection was carried out utilizing sterile technique from a superolateral arthroscopic portal position using a sterile 20-guage needle. Alcohol & betadine utilized for skin preparation:   (X) The needle was placed under ultrasound guidance to improve accuracy and minimize risk to the patient. Diagnostic ultrasound images in the long and short axis, subsequently saved, revealed OA   0cc of clear synovial fluid was aspirated utilizing a sterile 18-guage needle. The specimen: (X) appeared benign and was discarded ( ) was sent for Culture / Cell Count / Crystal analysis / [_].   Injection into the target area with care taken to aspirate frequently to minimize the risk of intravascular injection was performed with:   ( ) 1cc of Depomedrol (80mg/ml) ( ) 1cc of Dexamethasone (10mg/ml) ( ) 1cc of Toradol (30mg/ml) ( ) 9cc of 0.5% Bupivacaine (X) 5cc of 1% Lidocaine ( ) 5cc of 32mg Zilretta, prepared and diluted per  instructions ( ) 2 cc of Hylan G-F 20 (Synvisc) 16mg/2ml ( ) 6 cc of Hylan G-F 20 (SynvisoOne) 16mg/2ml ( ) 2cc of Euflexxa ( ) 2cc of Orthovisc (X) 2cc of GelOne ( ) 3cc of Durolane (20mg/ml)   Patient tolerated the procedure well and direct pressure was applied for hemostasis. The patient was reminded of potential post-injection risks including, but not limited to, delayed hypersensitivity reactions and/or infection.  The patient verified that they had the office and the Emergency Room's contact information if any problems should arise.  After several minutes, the patient informed me that they felt fine and was released from the office.  The patient's current medication management of their orthopedic diagnosis was reviewed today: The patient declined and/or was contraindicated for the recommended prescription medication Naprosyn and will use over the counter Advil, Aleve, Voltaren Gel or Tylenol as directed. (1) We discussed a comprehensive treatment plan that included possible pharmaceutical management involving the use of prescription strength medications including but not limited to options such as oral Naprosyn 500mg BID, once daily Meloxicam 15 mg, or 500-650 mg Tylenol versus over the counter oral medications and topical prescription NSAID Pennsaid vs over the counter Voltaren gel.  Based on our extensive discussion, the patient declined prescription medication and will use over the counter Advil, Aleve, Voltaren Gel or Tylenol as directed. (2) There is a moderate risk of morbidity with further treatment, especially from use of prescription strength medications and possible side effects of these medications which include upset stomach with oral medications, skin reactions to topical medications and cardiac/renal issues with long term use. (3) I recommended that the patient follow-up with their medical physician to discuss any significant specific potential issues with long term medication use such as interactions with current medications or with exacerbation of underlying medical comorbidities. (4) The benefits and risks associated with use of injectable, oral or topical, prescription and over the counter anti-inflammatory medications were discussed with the patient. The patient voiced understanding of the risks including but not limited to bleeding, stroke, kidney dysfunction, heart disease, and were referred to the black box warning label for further information.   Hermelinda MONTENEGRO attest that this documentation has been prepared under the direction and in the presence of Provider Dr. Joe Castro.  The documentation recorded by the scribe accurately reflects the services IDr. Joe, personally performed and the decisions made by me.

## 2025-03-28 NOTE — IMAGING
[de-identified] : The patient is a well appearing 76 year old male of their stated age. Patient ambulates with a normal gait. Negative straight leg raise bilateral   Effected Knee: RIGHT ROM:  0-135 degrees Lachman: Negative Pivot Shift: Negative Anterior Drawer: Negative Posterior Drawer / Sag: Negative Varus Stress 0 degrees: Stable Varus Stress 30 degrees: Stable Valgus Stress 0 degrees: Stable Valgus Stress 30 degrees: Stable Medial Kannan: Negative  Lateral Kannan: Negative Patella Glide: 2+ Patella Apprehension: Negative Patella Grind: Negative   Palpation: Medial Joint Line: TENDER  Lateral Joint Line: Nontender Medial Collateral Ligament: Nontender Lateral Collateral Ligament/PLC: Nontender Distal Femur: Nontender Proximal Tibia: Nontender Tibial Tubercle: Nontender Distal Pole Patella: Nontender Quadriceps Tendon: Nontender & Intact Patella Tendon: Nontender & Intact Medial Femoral Condyle: Nontender Medial Distal Hamstring/PES: Nontender Lateral Distal Hamstring: Nontender & Stable Iliotibial Band: Nontender Medial Patellofemoral Ligament: Nontender Adductor: Nontender Proximal GSC-Plantaris: Nontender Calf: Supple & Nontender   Inspection: Deformity: No Erythema: No Ecchymosis: No Abrasions: No Effusion: MINIMAL  Prepatella Bursitis: No Neurologic Exam: Sensation L4-S1: Grossly Intact Motor Exam: Quadriceps: 5 out of 5 Hamstrings: 5 out of 5 EHL: 5 out of 5 FHL: 5 out of 5 TA: 5 out of 5 GS: 5 out of 5 Circulatory/Pulses: Dorsalis Pedis: 2+ Posterior Tibialis: 2+ Additional Pertinent Findings: None   >>>>>>>>>>>>>>>>>>>>>>>>>>>>>>>>>>>>>>>>>>>>>>>>>>>>>>>>>>>>>>>>  Effected Knee: LEFT  ROM:  0-135 degrees Lachman: Negative Pivot Shift: Negative Anterior Drawer: Negative Posterior Drawer / Sag: Negative Varus Stress 0 degrees: Stable Varus Stress 30 degrees: Stable Valgus Stress 0 degrees: Stable Valgus Stress 30 degrees: Stable Medial Kannan: Negative  Lateral Kannan: Negative Patella Glide: 2+ Patella Apprehension: Negative Patella Grind: POSITIVE    Palpation: Medial Joint Line: TENDER  Lateral Joint Line: TENDER  Medial Collateral Ligament: Nontender Lateral Collateral Ligament/PLC: Nontender Distal Femur: Nontender Proximal Tibia: Nontender Tibial Tubercle: Nontender Distal Pole Patella: Nontender Quadriceps Tendon: Nontender & Intact Patella Tendon: Nontender & Intact Medial Femoral Condyle: Nontender Medial Distal Hamstring/PES: Nontender Lateral Distal Hamstring: Nontender & Stable Iliotibial Band: Nontender Medial Patellofemoral Ligament: Nontender Adductor: Nontender Proximal GSC-Plantaris: Nontender Calf: Supple & Nontender   Inspection: Deformity: No Erythema: No Ecchymosis: No Abrasions: No Effusion: MILD  Prepatella Bursitis: No Neurologic Exam: Sensation L4-S1: Grossly Intact Motor Exam: Quadriceps: 5 out of 5 Hamstrings: 5 out of 5 EHL: 5 out of 5 FHL: 5 out of 5 TA: 5 out of 5 GS: 5 out of 5 Circulatory/Pulses: Dorsalis Pedis: 2+ Posterior Tibialis: 2+ Additional Pertinent Findings: None   Assessment: The patient is a 76-year-old male with bilateral knee pain and radiographic and physical exam findings consistent with OA and meniscal degeneration. The patient's condition is acute Documents/Results Reviewed Today: None   Tests/Studies Independently Interpreted Today: None     Pertinent findings include: RIGHT KNEE: 0-135, minimal effusion, +patellofemoral grind, MJLT. LEFT KNEE: 0-135, mild effusion, +patellofemoral grind, MJLT, LJLT.  Confounding medical conditions/concerns: PMH Diabetes, last A1C 7.1.   Plan: The patient reports mild relief from previous bilateral knee GelONE injections, left knee on 10/3/24 and right knee on 9/26/24 but symptoms have since returned. Again, we reviewed all treatment options both operative vs non-operative for the patient's bilateral knee arthritis. Patient will continue HEP to focus on strengthening. The patient is aware and understands that if he fails all conservative treatment modalities, he should consider a total knee arthroplasty. Discussed treatment options in the form of injections to aid in pain, inflammation, and discomfort. Patient elected to receive Right knee GelOne. Advised patient to rest and ice the area as tolerated. Continue use of reparel knee sleeve. Discussed taking OTC anti-inflammatories as needed - use as directed. Modify activity as discussed.  Tests Ordered: None  Prescription Medications Ordered: Discussed appropriate use of OTC anti-inflammatories and analgesics (including but not limited to Aleve, Advil, Tylenol, Motrin, Ibuprofen, Voltaren gel, etc.)   Braces/DME Ordered: None Activity/Work/Sports Status: As tolerated  Additional Instructions: Avoid deep squatting  Follow-Up:  for left knee after 4/4/2025  Procedure Note: Musculoskeletal Injection Diagnosis: Right knee OA Procedure: Right knee, superolateral, Gel One   Indication: The patient has had persistent pain despite conservative treatment.  Risks, benefits and alternatives to procedure were discussed; all questions were answered to the patient's apparent satisfaction and informed consent obtained. Consent form was signed and dated today.  The patient denied prior problems with local anesthetics, injectable cortisones, chicken allergy, coagulopathy and no relevant drug or preservative allergies or sensitivities.   The area of injection was prepared in a sterile fashion.  Prior to injection a 'Time Out' was conducted in accordance with [facility] policy and the site and nature of procedure verified with the patient.   Procedure: The injection was carried out utilizing sterile technique from a superolateral arthroscopic portal position using a sterile 20-guage needle. Alcohol & betadine utilized for skin preparation:   (X) The needle was placed under ultrasound guidance to improve accuracy and minimize risk to the patient. Diagnostic ultrasound images in the long and short axis, subsequently saved, revealed OA   0cc of clear synovial fluid was aspirated utilizing a sterile 18-guage needle. The specimen: (X) appeared benign and was discarded ( ) was sent for Culture / Cell Count / Crystal analysis / [_].   Injection into the target area with care taken to aspirate frequently to minimize the risk of intravascular injection was performed with:   ( ) 1cc of Depomedrol (80mg/ml) ( ) 1cc of Dexamethasone (10mg/ml) ( ) 1cc of Toradol (30mg/ml) ( ) 9cc of 0.5% Bupivacaine (X) 5cc of 1% Lidocaine ( ) 5cc of 32mg Zilretta, prepared and diluted per  instructions ( ) 2 cc of Hylan G-F 20 (Synvisc) 16mg/2ml ( ) 6 cc of Hylan G-F 20 (SynvisoOne) 16mg/2ml ( ) 2cc of Euflexxa ( ) 2cc of Orthovisc (X) 2cc of GelOne ( ) 3cc of Durolane (20mg/ml)   Patient tolerated the procedure well and direct pressure was applied for hemostasis. The patient was reminded of potential post-injection risks including, but not limited to, delayed hypersensitivity reactions and/or infection.  The patient verified that they had the office and the Emergency Room's contact information if any problems should arise.  After several minutes, the patient informed me that they felt fine and was released from the office.  The patient's current medication management of their orthopedic diagnosis was reviewed today: The patient declined and/or was contraindicated for the recommended prescription medication Naprosyn and will use over the counter Advil, Aleve, Voltaren Gel or Tylenol as directed. (1) We discussed a comprehensive treatment plan that included possible pharmaceutical management involving the use of prescription strength medications including but not limited to options such as oral Naprosyn 500mg BID, once daily Meloxicam 15 mg, or 500-650 mg Tylenol versus over the counter oral medications and topical prescription NSAID Pennsaid vs over the counter Voltaren gel.  Based on our extensive discussion, the patient declined prescription medication and will use over the counter Advil, Aleve, Voltaren Gel or Tylenol as directed. (2) There is a moderate risk of morbidity with further treatment, especially from use of prescription strength medications and possible side effects of these medications which include upset stomach with oral medications, skin reactions to topical medications and cardiac/renal issues with long term use. (3) I recommended that the patient follow-up with their medical physician to discuss any significant specific potential issues with long term medication use such as interactions with current medications or with exacerbation of underlying medical comorbidities. (4) The benefits and risks associated with use of injectable, oral or topical, prescription and over the counter anti-inflammatory medications were discussed with the patient. The patient voiced understanding of the risks including but not limited to bleeding, stroke, kidney dysfunction, heart disease, and were referred to the black box warning label for further information.   Hermelinda MONTENEGRO attest that this documentation has been prepared under the direction and in the presence of Provider Dr. Joe Castro.  The documentation recorded by the scribe accurately reflects the services IDr. Joe, personally performed and the decisions made by me.

## 2025-04-03 NOTE — PHYSICAL EXAM
[No Lymphadenopathy] : no lymphadenopathy [Supple] : supple [Normal] : normal rate, regular rhythm, normal S1 and S2 and no murmur heard [No Edema] : there was no peripheral edema [No Focal Deficits] : no focal deficits [Normal Affect] : the affect was normal [Normal Mood] : the mood was normal [Normal Insight/Judgement] : insight and judgment were intact [de-identified] : no calf tenderness b/l LE

## 2025-04-03 NOTE — PHYSICAL EXAM
[No Lymphadenopathy] : no lymphadenopathy [Supple] : supple [Normal] : normal rate, regular rhythm, normal S1 and S2 and no murmur heard [No Edema] : there was no peripheral edema [No Focal Deficits] : no focal deficits [Normal Affect] : the affect was normal [Normal Mood] : the mood was normal [Normal Insight/Judgement] : insight and judgment were intact [de-identified] : no calf tenderness b/l LE

## 2025-04-03 NOTE — HISTORY OF PRESENT ILLNESS
[FreeTextEntry1] : patient presents for follow up. [de-identified] : 77yo M presents for f/u HTN, HLD and DM II  he is feeling well  Denies chest pain, palpitations, shortness of breath, Headaches, vision changes or LE edema denies muscle pain

## 2025-04-03 NOTE — HISTORY OF PRESENT ILLNESS
[FreeTextEntry1] : patient presents for follow up. [de-identified] : 75yo M presents for f/u HTN, HLD and DM II  he is feeling well  Denies chest pain, palpitations, shortness of breath, Headaches, vision changes or LE edema denies muscle pain

## 2025-04-16 NOTE — REVIEW OF SYSTEMS
[Fever] : no fever [Chills] : no chills [Nasal Discharge] : nasal discharge [Sore Throat] : sore throat [Chest Pain] : no chest pain [Shortness Of Breath] : no shortness of breath

## 2025-04-16 NOTE — PLAN
[FreeTextEntry1] :     URI advised nasal saline  rapid covid neg rapid flu neg rapid strep neg  sent out rsv flu covid swab and throat cx cxr stat  if all send out swabs neg and no relief will send antibiotic  advised if sob or chest pain to go to ed or call 911 right away and do not wait f/u if no relief pt agreed w/plan

## 2025-04-16 NOTE — PHYSICAL EXAM
[Normal] : the outer ears and nose were normal in appearance and the oropharynx was normal [No Lymphadenopathy] : no lymphadenopathy [Supple] : supple [No Respiratory Distress] : no respiratory distress  [No Accessory Muscle Use] : no accessory muscle use [Normal S1, S2] : normal S1 and S2 [No Edema] : there was no peripheral edema [No Focal Deficits] : no focal deficits [Normal Affect] : the affect was normal [Normal Mood] : the mood was normal [Normal Insight/Judgement] : insight and judgment were intact [de-identified] : mild mucus in back of throat  [de-identified] : decreased breath sounds right base  [de-identified] : no calf tenderness b/l LE

## 2025-04-16 NOTE — HISTORY OF PRESENT ILLNESS
[FreeTextEntry8] : Patient presents with sinus pressure/congestion, tickle in throat since monday c/o  nasal congestion denies fevers,chills, body aches, cough,sore throat, sob, chest pain, diarrhea, loss of taste or smell denies recent travel

## 2025-04-24 NOTE — ASSESSMENT
[FreeTextEntry1] : 76 year old female with medical history significant for HTN, HLD, radiculopathy, T2DM, glaucoma, kidney stones presenting today referred by PCP Endorsing several year h/o paresthesia b/l feet, low back pain radiating to LLE, worsened with prolonged activity. Has been on  mg 2 cap AM 2 cap PM. Mild tingling in fingertips - thumb, pointer and middle finger - onset 1 month ago.  Physical exam with impaired sensation to light touch, vibration. Scoliotic gait.  Discussed with pt that his BLE symptoms are likely combination of diabetic neuropathy and lumbar radiculopathy. His BUE paresthesia distribution is possible CTS, mild.  Recommendations: - EMG/NCS of BUE and BLE to evaluate for conduction abnormalities - laboratory work up for neuropathy - increase  mg to 600/300/600 for one week, and if well tolerated, 600/600/600 thereafter for TDD 1800 mg - counseled pt on possible side effects - continue Metanx/vitamin B supplement as instructed by podiatrist - consider TCA or Cymbalta at next visit - obtain records of ov notes, MRI studies from pain management Dr. Kinney - advised pt to f/u with pain mgmt for epidural, trigger points or other procedures for his chronic LBP - trial OTC wrist splints nightly for possible CTS - lifestyle modifications - healthy diet, DM control, stretching and activity as tolerated, avoid prolonged positioning or provocative maneuvers

## 2025-04-24 NOTE — PHYSICAL EXAM
[FreeTextEntry1] : NEURO: Mental Status Oriented to person, place, time, and situation Speech is clear, fluent, and spontaneous. Comprehension and memory intact   Cranial Nerves Visual fields full to confrontation Pupils equal, round, reactive to light. Extraocular movements intact. No nystagmus or ptosis. Facial sensation intact and symmetric in V1, V2, V3 Facial movement intact and symmetric Uvula midline, soft palate elevates normally Bilateral shoulder shrug intact Tongue midline, no tongue bite sign or deviation on protrusion   Motor Tone and bulk intact Shoulder abduction: 5/5 b/l Elbow flexion/extension: 5/5 bl Hand : 5/5 b/l Hip flexion/extension: 5/5 b/l Knee flexion/extension: 5/5 b/l Dorsiflexion/plantar flexion: 5/5 b/l   Sensation Dysesthesia to light touch b/l feet Vibration diminished b/l great toe, intact b/l knee Proprioception, temperature intact No extinction Neg Tinel's and Phalen's b/l wrist   Deep Tendon Reflexes 1+ throughout    Coordination Normal finger to nose bilaterally No past pointing   Scoliotic gait.    GEN: awake, alert, interactive, no acute distress EYES: sclera white, conjunctiva clear, no redness or discharge ENT: normal appearing outer ears, hearing grossly intact EXT: moving all extremities spontaneously SKIN: warm, dry

## 2025-04-24 NOTE — HISTORY OF PRESENT ILLNESS
[FreeTextEntry1] : Devin Mckeon is a 76 year old female with medical history significant for HTN, HLD, radiculopathy, T2DM, glaucoma, kidney stones presenting today referred by PCP. Patient states he was referred to see neurologist who is out of office today.  He endorses bilateral lower extremity paresthesia for the past several years, does not remember an exact timeline.  He was diagnosed with diabetes over 10 years ago, follows up with endocrinologist Dr. Brown.  He describes his symptoms as a burning sensation of bilateral feet, does not go beyond his ankles. He takes Metanx (vitamin B supplement) daily at the recommendation of podiatrist. He also endorses several year history of lower back pain, with radiating sciatic pain into the left leg, described as a constant gnawing and aggravating pain.  He does see pain management, Dr. Kinney, who prescribed gabapentin 300 mg, and patient is taking 2 capsules in the morning time and 2 capsules in the evening time.  He has been on this medication for years, he does unsure that it is helpful, but also denies any significant side effects including sedation, grogginess, dizziness, confusion. Dr. Kinney has gotten different MRIs of his back, patient was told by it showed scoliotic changes but is unsure of what else.  Patient was also recommended to go for injections/procedures for his back pain, however he has not made an appointment for this yet. He went to PT in the past, tries to do exercises/stretches at home when he can.  He has been busy because his wife has been in the hospital for the past 5 months, so he spends a lot of time they are going back and forth.  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  Lately he has been sleeping on a recliner or couch.  He also endorses some slight tingling sensation in his thumb and first 2 fingers of both hands, there is no particular pain or weakness, but something that he started noticing over the past month.  He is right-hand dominant.  He takes OTC Advil every day for back pain, denies any history of GI bleed or pain. No saddle anesthesia or incontinence.

## 2025-06-27 NOTE — ASSESSMENT
[FreeTextEntry1] : 76 year old female with medical history significant for HTN, HLD, radiculopathy, T2DM, glaucoma, kidney stones, here for follow up peripheral neuropathy.  Endorsing several year h/o paresthesia b/l feet, low back pain radiating to LLE, worsened with prolonged activity. Has been on  mg 2 cap AM 2 cap PM. Mild tingling in fingertips - thumb, pointer and middle finger - onset 1 month ago.  Physical exam with impaired sensation to light touch, vibration. Scoliotic gait. B6 was elevated due to over supplementation. He has not noticed much benefit from gabapentin.   Recommendations: - EMG/NCS of BUE and BLE to evaluate for conduction abnormalities  -Will continue on gabapentin at current dose 600/300/600. counseled pt on possible side effects - can restart supplement only once daily - consider Lyrica or Cymbalta as alternative - Also discussed red light therapy. He would have to find a provider that does this treatment - can try alpha lipoic acid 600-1200mg daily  - lifestyle modifications - healthy diet, DM control, stretching and activity as tolerated, avoid prolonged positioning or provocative maneuvers  Follow up with PA in 3 months

## 2025-06-27 NOTE — HISTORY OF PRESENT ILLNESS
[FreeTextEntry1] : June 26L Devin Mckeon is a 76 year old female with medical history significant for HTN, HLD, radiculopathy, T2DM, glaucoma, kidney stones presenting today referred by PCP. He has no change in neuropathy symptoms, usually worse with activity, up to mid shin. When active can be up to the knee. He has burning and stabbing, tightness in hands. He was doing physical therapy up until this spring.  B6 was 80.6, B12 >2000. He was instructed to reduce dose of B complex. He is on 600/300/600mg GPB daily.    He endorses bilateral lower extremity paresthesia for the past several years, does not remember an exact timeline.  He was diagnosed with diabetes over 10 years ago, follows up with endocrinologist Dr. Brown.  He describes his symptoms as a burning sensation of bilateral feet, does not go beyond his ankles. He takes Metanx (vitamin B supplement) daily at the recommendation of podiatrist. He also endorses several year history of lower back pain, with radiating sciatic pain into the left leg, described as a constant gnawing and aggravating pain.  He does see pain management, Dr. Kineny, who prescribed gabapentin 300 mg, and patient is taking 2 capsules in the morning time and 2 capsules in the evening time.  He has been on this medication for years, he does unsure that it is helpful, but also denies any significant side effects including sedation, grogginess, dizziness, confusion. Dr. Kinney has gotten different MRIs of his back, patient was told by it showed scoliotic changes but is unsure of what else.  Patient was also recommended to go for injections/procedures for his back pain, however he has not made an appointment for this yet. He went to PT in the past, tries to do exercises/stretches at home when he can.  He has been busy because his wife has been in the hospital for the past 5 months, so he spends a lot of time they are going back and forth.  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  Lately he has been sleeping on a recliner or couch.  He also endorses some slight tingling sensation in his thumb and first 2 fingers of both hands, there is no particular pain or weakness, but something that he started noticing over the past month.  He is right-hand dominant.  He takes OTC Advil every day for back pain, denies any history of GI bleed or pain. No saddle anesthesia or incontinence.

## 2025-06-27 NOTE — HISTORY OF PRESENT ILLNESS
[FreeTextEntry1] : June 26L Devin Mckeon is a 76 year old female with medical history significant for HTN, HLD, radiculopathy, T2DM, glaucoma, kidney stones presenting today referred by PCP. He has no change in neuropathy symptoms, usually worse with activity, up to mid shin. When active can be up to the knee. He has burning and stabbing, tightness in hands. He was doing physical therapy up until this spring.  B6 was 80.6, B12 >2000. He was instructed to reduce dose of B complex. He is on 600/300/600mg GPB daily.    He endorses bilateral lower extremity paresthesia for the past several years, does not remember an exact timeline.  He was diagnosed with diabetes over 10 years ago, follows up with endocrinologist Dr. Brown.  He describes his symptoms as a burning sensation of bilateral feet, does not go beyond his ankles. He takes Metanx (vitamin B supplement) daily at the recommendation of podiatrist. He also endorses several year history of lower back pain, with radiating sciatic pain into the left leg, described as a constant gnawing and aggravating pain.  He does see pain management, Dr. Kinney, who prescribed gabapentin 300 mg, and patient is taking 2 capsules in the morning time and 2 capsules in the evening time.  He has been on this medication for years, he does unsure that it is helpful, but also denies any significant side effects including sedation, grogginess, dizziness, confusion. Dr. Kinney has gotten different MRIs of his back, patient was told by it showed scoliotic changes but is unsure of what else.  Patient was also recommended to go for injections/procedures for his back pain, however he has not made an appointment for this yet. He went to PT in the past, tries to do exercises/stretches at home when he can.  He has been busy because his wife has been in the hospital for the past 5 months, so he spends a lot of time they are going back and forth.  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  Lately he has been sleeping on a recliner or couch.  He also endorses some slight tingling sensation in his thumb and first 2 fingers of both hands, there is no particular pain or weakness, but something that he started noticing over the past month.  He is right-hand dominant.  He takes OTC Advil every day for back pain, denies any history of GI bleed or pain. No saddle anesthesia or incontinence.

## 2025-07-07 NOTE — HISTORY OF PRESENT ILLNESS
[de-identified] : The patient is a 76 year old right hand dominant male who presents today complaining of bilateral knee pain.  Date of Injury/Onset: 7/18/24 Pain:    At Rest: 3/10  With Activity:  4/10  Mechanism of injury: Gradual onset of pain  This is NOT a Work Related Injury being treated under Worker's Compensation. This is NOT an athletic injury occurring associated with an interscholastic or organized sports team. Quality of symptoms: swelling, pressure, burning, weakness, limited ROM  Improves with: brace, compression wrap, ice  Worse with: prolonged standing  Treatment/Imaging/Studies Since Last Visit: right knee gel one/aspiration 9/26/24, left knee gel one injection 10/3/24, rt knee gel one 3/27/2025 	Reports Available For Review Today: none Out of work/sport: Currently working  School/Sport/Position/Occupation: [employer] , yardwork, etc.  Changes since last visit: Patient present today doing well s/p right knee gel injection. He is still wearing the reparel sleeve on the right. He c/o persistent pain in the left knee.  Additional Information: PMH Diabetes, last A1C 7.2, sees Dr. Odell for injections - left side LBP

## 2025-07-07 NOTE — HISTORY OF PRESENT ILLNESS
[de-identified] : The patient is a 76 year old right hand dominant male who presents today complaining of bilateral knee pain.  Date of Injury/Onset: 7/18/24 Pain:    At Rest: 3/10  With Activity:  4/10  Mechanism of injury: Gradual onset of pain  This is NOT a Work Related Injury being treated under Worker's Compensation. This is NOT an athletic injury occurring associated with an interscholastic or organized sports team. Quality of symptoms: swelling, pressure, burning, weakness, limited ROM  Improves with: brace, compression wrap, ice  Worse with: prolonged standing  Treatment/Imaging/Studies Since Last Visit: right knee gel one/aspiration 9/26/24, left knee gel one injection 10/3/24, rt knee gel one 3/27/2025 	Reports Available For Review Today: none Out of work/sport: Currently working  School/Sport/Position/Occupation: [employer] , yardwork, etc.  Changes since last visit: Patient present today doing well s/p right knee gel injection. He is still wearing the reparel sleeve on the right. He c/o persistent pain in the left knee.  Additional Information: PMH Diabetes, last A1C 7.2, sees Dr. Odell for injections - left side LBP

## 2025-07-07 NOTE — IMAGING
[Left] : left knee [de-identified] : The patient is a well appearing 76 year old male of their stated age. Patient ambulates with a normal gait. Negative straight leg raise bilateral   Effected Knee: RIGHT ROM:  0-135 degrees Lachman: Negative Pivot Shift: Negative Anterior Drawer: Negative Posterior Drawer / Sag: Negative Varus Stress 0 degrees: Stable Varus Stress 30 degrees: Stable Valgus Stress 0 degrees: Stable Valgus Stress 30 degrees: Stable Medial Kannan: Negative  Lateral Kannan: Negative Patella Glide: 2+ Patella Apprehension: Negative Patella Grind: Negative   Palpation: Medial Joint Line: TENDER  Lateral Joint Line: Nontender Medial Collateral Ligament: Nontender Lateral Collateral Ligament/PLC: Nontender Distal Femur: Nontender Proximal Tibia: Nontender Tibial Tubercle: Nontender Distal Pole Patella: Nontender Quadriceps Tendon: Nontender & Intact Patella Tendon: Nontender & Intact Medial Femoral Condyle: Nontender Medial Distal Hamstring/PES: Nontender Lateral Distal Hamstring: Nontender & Stable Iliotibial Band: Nontender Medial Patellofemoral Ligament: Nontender Adductor: Nontender Proximal GSC-Plantaris: Nontender Calf: Supple & Nontender   Inspection: Deformity: No Erythema: No Ecchymosis: No Abrasions: No Effusion: MINIMAL  Prepatella Bursitis: No Neurologic Exam: Sensation L4-S1: Grossly Intact Motor Exam: Quadriceps: 5 out of 5 Hamstrings: 5 out of 5 EHL: 5 out of 5 FHL: 5 out of 5 TA: 5 out of 5 GS: 5 out of 5 Circulatory/Pulses: Dorsalis Pedis: 2+ Posterior Tibialis: 2+ Additional Pertinent Findings: None   >>>>>>>>>>>>>>>>>>>>>>>>>>>>>>>>>>>>>>>>>>>>>>>>>>>>>>>>>>>>>>>>  Effected Knee: LEFT  ROM:  0-135 degrees Lachman: Negative Pivot Shift: Negative Anterior Drawer: Negative Posterior Drawer / Sag: Negative Varus Stress 0 degrees: Stable Varus Stress 30 degrees: Stable Valgus Stress 0 degrees: Stable Valgus Stress 30 degrees: Stable Medial Kannan: Negative  Lateral Kannan: Negative Patella Glide: 2+ Patella Apprehension: Negative Patella Grind: POSITIVE    Palpation: Medial Joint Line: TENDER  Lateral Joint Line: TENDER  Medial Collateral Ligament: Nontender Lateral Collateral Ligament/PLC: Nontender Distal Femur: Nontender Proximal Tibia: Nontender Tibial Tubercle: Nontender Distal Pole Patella: Nontender Quadriceps Tendon: Nontender & Intact Patella Tendon: Nontender & Intact Medial Femoral Condyle: Nontender Medial Distal Hamstring/PES: Nontender Lateral Distal Hamstring: Nontender & Stable Iliotibial Band: Nontender Medial Patellofemoral Ligament: Nontender Adductor: Nontender Proximal GSC-Plantaris: Nontender Calf: Supple & Nontender   Inspection: Deformity: No Erythema: No Ecchymosis: No Abrasions: No Effusion: MILD  Prepatella Bursitis: No Neurologic Exam: Sensation L4-S1: Grossly Intact Motor Exam: Quadriceps: 5 out of 5 Hamstrings: 5 out of 5 EHL: 5 out of 5 FHL: 5 out of 5 TA: 5 out of 5 GS: 5 out of 5 Circulatory/Pulses: Dorsalis Pedis: 2+ Posterior Tibialis: 2+ Additional Pertinent Findings: None   Assessment: The patient is a 76-year-old male with bilateral knee pain and radiographic and physical exam findings consistent with OA and meniscal degeneration. The patient's condition is acute Documents/Results Reviewed Today: X-ray left knee Tests/Studies Independently Interpreted Today: X-ray left knee reveals medial joint line narrowing consistent with OA, patellofemoral OA, arthrosclerosis in posterior vasculature   Pertinent findings include: RIGHT KNEE: 0-135, minimal effusion, +patellofemoral grind, MJLT. LEFT KNEE: 0-135, mild effusion, +patellofemoral grind, MJLT, LJLT.  Confounding medical conditions/concerns: PMH Diabetes, last A1C 7.1.   Plan: The patient reports mild relief from previous GelOne injections but reports that he is still dependent on utilizing his sleeve for his right knee. Again, we reviewed all treatment options both operative vs non-operative for the patient's bilateral knee arthritis. Patient will continue HEP to focus on strengthening. The patient is aware and understands that if he fails all conservative treatment modalities, he should consider a total knee arthroplasty. Discussed treatment options in the form of injections to aid in pain, inflammation, and discomfort. Patient has elected to receive a LEFT knee GelOne. Advised patient to rest and ice the area as tolerated. Continue use of reparel knee sleeve. Discussed taking OTC anti-inflammatories as needed - use as directed. Modify activity as discussed. Follow up in 6 months.  Tests Ordered: None  Prescription Medications Ordered: Discussed appropriate use of OTC anti-inflammatories and analgesics (including but not limited to Aleve, Advil, Tylenol, Motrin, Ibuprofen, Voltaren gel, etc.)   Braces/DME Ordered: None Activity/Work/Sports Status: As tolerated  Additional Instructions: Avoid deep squatting  Follow-Up: 6 months  Procedure Note: Musculoskeletal Injection Diagnosis: Left knee OA Procedure: Left knee, superolateral, Gel One   Indication: The patient has had persistent pain despite conservative treatment.  Risks, benefits and alternatives to procedure were discussed; all questions were answered to the patient's apparent satisfaction and informed consent obtained. Consent form was signed and dated today.  The patient denied prior problems with local anesthetics, injectable cortisones, chicken allergy, coagulopathy and no relevant drug or preservative allergies or sensitivities.   The area of injection was prepared in a sterile fashion.  Prior to injection a 'Time Out' was conducted in accordance with [facility] policy and the site and nature of procedure verified with the patient.   Procedure: The injection was carried out utilizing sterile technique from a superolateral arthroscopic portal position using a sterile 20-guage needle. Alcohol & betadine utilized for skin preparation:   (X) The needle was placed under ultrasound guidance to improve accuracy and minimize risk to the patient. Diagnostic ultrasound images in the long and short axis, subsequently saved, revealed OA   0cc of clear synovial fluid was aspirated utilizing a sterile 18-guage needle. The specimen: (X) appeared benign and was discarded ( ) was sent for Culture / Cell Count / Crystal analysis / [_].   Injection into the target area with care taken to aspirate frequently to minimize the risk of intravascular injection was performed with:   ( ) 1cc of Depomedrol (80mg/ml) ( ) 1cc of Dexamethasone (10mg/ml) ( ) 1cc of Toradol (30mg/ml) ( ) 9cc of 0.5% Bupivacaine (X) 5cc of 1% Lidocaine ( ) 5cc of 32mg Zilretta, prepared and diluted per  instructions ( ) 2 cc of Hylan G-F 20 (Synvisc) 16mg/2ml ( ) 6 cc of Hylan G-F 20 (SynvisoOne) 16mg/2ml ( ) 2cc of Euflexxa ( ) 2cc of Orthovisc (X) 2cc of GelOne ( ) 3cc of Durolane (20mg/ml)   Patient tolerated the procedure well and direct pressure was applied for hemostasis. The patient was reminded of potential post-injection risks including, but not limited to, delayed hypersensitivity reactions and/or infection.  The patient verified that they had the office and the Emergency Room's contact information if any problems should arise.  After several minutes, the patient informed me that they felt fine and was released from the office.  The patient's current medication management of their orthopedic diagnosis was reviewed today: The patient declined and/or was contraindicated for the recommended prescription medication Naprosyn and will use over the counter Advil, Aleve, Voltaren Gel or Tylenol as directed. (1) We discussed a comprehensive treatment plan that included possible pharmaceutical management involving the use of prescription strength medications including but not limited to options such as oral Naprosyn 500mg BID, once daily Meloxicam 15 mg, or 500-650 mg Tylenol versus over the counter oral medications and topical prescription NSAID Pennsaid vs over the counter Voltaren gel.  Based on our extensive discussion, the patient declined prescription medication and will use over the counter Advil, Aleve, Voltaren Gel or Tylenol as directed. (2) There is a moderate risk of morbidity with further treatment, especially from use of prescription strength medications and possible side effects of these medications which include upset stomach with oral medications, skin reactions to topical medications and cardiac/renal issues with long term use. (3) I recommended that the patient follow-up with their medical physician to discuss any significant specific potential issues with long term medication use such as interactions with current medications or with exacerbation of underlying medical comorbidities. (4) The benefits and risks associated with use of injectable, oral or topical, prescription and over the counter anti-inflammatory medications were discussed with the patient. The patient voiced understanding of the risks including but not limited to bleeding, stroke, kidney dysfunction, heart disease, and were referred to the black box warning label for further information.   Urszula MONTENEGRO attest that this documentation has been prepared under the direction and in the presence of Provider Dr. Joe Castro.   The documentation recorded by the scribe accurately reflects the services IDr. Joe, personally performed and the decisions made by me.  [FreeTextEntry9] : X-ray left knee reveals medial joint line narrowing consistent with OA, patellofemoral OA, arthrosclerosis

## 2025-07-07 NOTE — IMAGING
[Left] : left knee [de-identified] : The patient is a well appearing 76 year old male of their stated age. Patient ambulates with a normal gait. Negative straight leg raise bilateral   Effected Knee: RIGHT ROM:  0-135 degrees Lachman: Negative Pivot Shift: Negative Anterior Drawer: Negative Posterior Drawer / Sag: Negative Varus Stress 0 degrees: Stable Varus Stress 30 degrees: Stable Valgus Stress 0 degrees: Stable Valgus Stress 30 degrees: Stable Medial Kannan: Negative  Lateral Kannan: Negative Patella Glide: 2+ Patella Apprehension: Negative Patella Grind: Negative   Palpation: Medial Joint Line: TENDER  Lateral Joint Line: Nontender Medial Collateral Ligament: Nontender Lateral Collateral Ligament/PLC: Nontender Distal Femur: Nontender Proximal Tibia: Nontender Tibial Tubercle: Nontender Distal Pole Patella: Nontender Quadriceps Tendon: Nontender & Intact Patella Tendon: Nontender & Intact Medial Femoral Condyle: Nontender Medial Distal Hamstring/PES: Nontender Lateral Distal Hamstring: Nontender & Stable Iliotibial Band: Nontender Medial Patellofemoral Ligament: Nontender Adductor: Nontender Proximal GSC-Plantaris: Nontender Calf: Supple & Nontender   Inspection: Deformity: No Erythema: No Ecchymosis: No Abrasions: No Effusion: MINIMAL  Prepatella Bursitis: No Neurologic Exam: Sensation L4-S1: Grossly Intact Motor Exam: Quadriceps: 5 out of 5 Hamstrings: 5 out of 5 EHL: 5 out of 5 FHL: 5 out of 5 TA: 5 out of 5 GS: 5 out of 5 Circulatory/Pulses: Dorsalis Pedis: 2+ Posterior Tibialis: 2+ Additional Pertinent Findings: None   >>>>>>>>>>>>>>>>>>>>>>>>>>>>>>>>>>>>>>>>>>>>>>>>>>>>>>>>>>>>>>>>  Effected Knee: LEFT  ROM:  0-135 degrees Lachman: Negative Pivot Shift: Negative Anterior Drawer: Negative Posterior Drawer / Sag: Negative Varus Stress 0 degrees: Stable Varus Stress 30 degrees: Stable Valgus Stress 0 degrees: Stable Valgus Stress 30 degrees: Stable Medial Kannan: Negative  Lateral Kannan: Negative Patella Glide: 2+ Patella Apprehension: Negative Patella Grind: POSITIVE    Palpation: Medial Joint Line: TENDER  Lateral Joint Line: TENDER  Medial Collateral Ligament: Nontender Lateral Collateral Ligament/PLC: Nontender Distal Femur: Nontender Proximal Tibia: Nontender Tibial Tubercle: Nontender Distal Pole Patella: Nontender Quadriceps Tendon: Nontender & Intact Patella Tendon: Nontender & Intact Medial Femoral Condyle: Nontender Medial Distal Hamstring/PES: Nontender Lateral Distal Hamstring: Nontender & Stable Iliotibial Band: Nontender Medial Patellofemoral Ligament: Nontender Adductor: Nontender Proximal GSC-Plantaris: Nontender Calf: Supple & Nontender   Inspection: Deformity: No Erythema: No Ecchymosis: No Abrasions: No Effusion: MILD  Prepatella Bursitis: No Neurologic Exam: Sensation L4-S1: Grossly Intact Motor Exam: Quadriceps: 5 out of 5 Hamstrings: 5 out of 5 EHL: 5 out of 5 FHL: 5 out of 5 TA: 5 out of 5 GS: 5 out of 5 Circulatory/Pulses: Dorsalis Pedis: 2+ Posterior Tibialis: 2+ Additional Pertinent Findings: None   Assessment: The patient is a 76-year-old male with bilateral knee pain and radiographic and physical exam findings consistent with OA and meniscal degeneration. The patient's condition is acute Documents/Results Reviewed Today: X-ray left knee Tests/Studies Independently Interpreted Today: X-ray left knee reveals medial joint line narrowing consistent with OA, patellofemoral OA, arthrosclerosis in posterior vasculature   Pertinent findings include: RIGHT KNEE: 0-135, minimal effusion, +patellofemoral grind, MJLT. LEFT KNEE: 0-135, mild effusion, +patellofemoral grind, MJLT, LJLT.  Confounding medical conditions/concerns: PMH Diabetes, last A1C 7.1.   Plan: The patient reports mild relief from previous GelOne injections but reports that he is still dependent on utilizing his sleeve for his right knee. Again, we reviewed all treatment options both operative vs non-operative for the patient's bilateral knee arthritis. Patient will continue HEP to focus on strengthening. The patient is aware and understands that if he fails all conservative treatment modalities, he should consider a total knee arthroplasty. Discussed treatment options in the form of injections to aid in pain, inflammation, and discomfort. Patient has elected to receive a LEFT knee GelOne. Advised patient to rest and ice the area as tolerated. Continue use of reparel knee sleeve. Discussed taking OTC anti-inflammatories as needed - use as directed. Modify activity as discussed. Follow up in 6 months.  Tests Ordered: None  Prescription Medications Ordered: Discussed appropriate use of OTC anti-inflammatories and analgesics (including but not limited to Aleve, Advil, Tylenol, Motrin, Ibuprofen, Voltaren gel, etc.)   Braces/DME Ordered: None Activity/Work/Sports Status: As tolerated  Additional Instructions: Avoid deep squatting  Follow-Up: 6 months  Procedure Note: Musculoskeletal Injection Diagnosis: Left knee OA Procedure: Left knee, superolateral, Gel One   Indication: The patient has had persistent pain despite conservative treatment.  Risks, benefits and alternatives to procedure were discussed; all questions were answered to the patient's apparent satisfaction and informed consent obtained. Consent form was signed and dated today.  The patient denied prior problems with local anesthetics, injectable cortisones, chicken allergy, coagulopathy and no relevant drug or preservative allergies or sensitivities.   The area of injection was prepared in a sterile fashion.  Prior to injection a 'Time Out' was conducted in accordance with [facility] policy and the site and nature of procedure verified with the patient.   Procedure: The injection was carried out utilizing sterile technique from a superolateral arthroscopic portal position using a sterile 20-guage needle. Alcohol & betadine utilized for skin preparation:   (X) The needle was placed under ultrasound guidance to improve accuracy and minimize risk to the patient. Diagnostic ultrasound images in the long and short axis, subsequently saved, revealed OA   0cc of clear synovial fluid was aspirated utilizing a sterile 18-guage needle. The specimen: (X) appeared benign and was discarded ( ) was sent for Culture / Cell Count / Crystal analysis / [_].   Injection into the target area with care taken to aspirate frequently to minimize the risk of intravascular injection was performed with:   ( ) 1cc of Depomedrol (80mg/ml) ( ) 1cc of Dexamethasone (10mg/ml) ( ) 1cc of Toradol (30mg/ml) ( ) 9cc of 0.5% Bupivacaine (X) 5cc of 1% Lidocaine ( ) 5cc of 32mg Zilretta, prepared and diluted per  instructions ( ) 2 cc of Hylan G-F 20 (Synvisc) 16mg/2ml ( ) 6 cc of Hylan G-F 20 (SynvisoOne) 16mg/2ml ( ) 2cc of Euflexxa ( ) 2cc of Orthovisc (X) 2cc of GelOne ( ) 3cc of Durolane (20mg/ml)   Patient tolerated the procedure well and direct pressure was applied for hemostasis. The patient was reminded of potential post-injection risks including, but not limited to, delayed hypersensitivity reactions and/or infection.  The patient verified that they had the office and the Emergency Room's contact information if any problems should arise.  After several minutes, the patient informed me that they felt fine and was released from the office.  The patient's current medication management of their orthopedic diagnosis was reviewed today: The patient declined and/or was contraindicated for the recommended prescription medication Naprosyn and will use over the counter Advil, Aleve, Voltaren Gel or Tylenol as directed. (1) We discussed a comprehensive treatment plan that included possible pharmaceutical management involving the use of prescription strength medications including but not limited to options such as oral Naprosyn 500mg BID, once daily Meloxicam 15 mg, or 500-650 mg Tylenol versus over the counter oral medications and topical prescription NSAID Pennsaid vs over the counter Voltaren gel.  Based on our extensive discussion, the patient declined prescription medication and will use over the counter Advil, Aleve, Voltaren Gel or Tylenol as directed. (2) There is a moderate risk of morbidity with further treatment, especially from use of prescription strength medications and possible side effects of these medications which include upset stomach with oral medications, skin reactions to topical medications and cardiac/renal issues with long term use. (3) I recommended that the patient follow-up with their medical physician to discuss any significant specific potential issues with long term medication use such as interactions with current medications or with exacerbation of underlying medical comorbidities. (4) The benefits and risks associated with use of injectable, oral or topical, prescription and over the counter anti-inflammatory medications were discussed with the patient. The patient voiced understanding of the risks including but not limited to bleeding, stroke, kidney dysfunction, heart disease, and were referred to the black box warning label for further information.   Urszula MONTENEGRO attest that this documentation has been prepared under the direction and in the presence of Provider Dr. Joe Castro.   The documentation recorded by the scribe accurately reflects the services IDr. Joe, personally performed and the decisions made by me.  [FreeTextEntry9] : X-ray left knee reveals medial joint line narrowing consistent with OA, patellofemoral OA, arthrosclerosis

## 2025-07-08 NOTE — PHYSICAL EXAM
[No Lymphadenopathy] : no lymphadenopathy [Supple] : supple [Normal] : no respiratory distress, lungs were clear to auscultation bilaterally and no accessory muscle use [Normal Rate] : normal rate  [Regular Rhythm] : with a regular rhythm [Normal S1, S2] : normal S1 and S2 [No Edema] : there was no peripheral edema [No Focal Deficits] : no focal deficits [Normal Affect] : the affect was normal [Normal Mood] : the mood was normal [Normal Insight/Judgement] : insight and judgment were intact [de-identified] : left ear w/ tympanostomy tube in place  [de-identified] : no calf tenderness b/l LE

## 2025-07-08 NOTE — PLAN
[FreeTextEntry1] : HTN controlled continue lisinopril 5mg po daily continue to monitor bp and f/u sooner if bp is rising  DM II controlled continue metformin ER 500mg 2 tabs po qhs  HLD controlled labs due in july he has a script from the endocrinologist  continue rosuvastatin 5mg po qhsHLD denies muscle pain  labs due in july he has a script from the endocrinologist   sam obtain   neuropathy seen by neurologist and pain management gabapentin was increased to 600mg po tid   advised to f/u for cpe but he doesnt want to do a cpe only a follow up  f/u 3mos or sooner if issues arise pt agreed w/plan.

## 2025-07-08 NOTE — HISTORY OF PRESENT ILLNESS
[FreeTextEntry1] :  Patient presents for 3 month follow up  [de-identified] : 75yo M presents for f/u HTN, HLD, DM II  Denies chest pain, palpitations, shortness of breath, Headaches, vision changes or LE edema, muscle pain  he is feeling well his wife passed away june 10th he said his weigh fluctuates by about 2lbs